# Patient Record
Sex: MALE | URBAN - METROPOLITAN AREA
[De-identification: names, ages, dates, MRNs, and addresses within clinical notes are randomized per-mention and may not be internally consistent; named-entity substitution may affect disease eponyms.]

---

## 2018-06-11 ENCOUNTER — LAB VISIT (OUTPATIENT)
Dept: LAB | Facility: HOSPITAL | Age: 63
End: 2018-06-11
Attending: INTERNAL MEDICINE
Payer: COMMERCIAL

## 2018-06-11 ENCOUNTER — OFFICE VISIT (OUTPATIENT)
Dept: RHEUMATOLOGY | Facility: CLINIC | Age: 63
End: 2018-06-11
Payer: COMMERCIAL

## 2018-06-11 VITALS
DIASTOLIC BLOOD PRESSURE: 77 MMHG | HEART RATE: 91 BPM | BODY MASS INDEX: 30.29 KG/M2 | SYSTOLIC BLOOD PRESSURE: 116 MMHG | WEIGHT: 223.63 LBS | HEIGHT: 72 IN

## 2018-06-11 DIAGNOSIS — Z79.631 LONG TERM METHOTREXATE USER: ICD-10-CM

## 2018-06-11 DIAGNOSIS — Z79.52 LONG TERM (CURRENT) USE OF SYSTEMIC STEROIDS: ICD-10-CM

## 2018-06-11 DIAGNOSIS — L53.9: ICD-10-CM

## 2018-06-11 DIAGNOSIS — Z55.9 EDUCATIONAL CIRCUMSTANCE: ICD-10-CM

## 2018-06-11 DIAGNOSIS — G72.9 MYOPATHY: ICD-10-CM

## 2018-06-11 DIAGNOSIS — Z86.718 HISTORY OF DVT (DEEP VEIN THROMBOSIS): ICD-10-CM

## 2018-06-11 DIAGNOSIS — L53.9: Primary | ICD-10-CM

## 2018-06-11 LAB
C3 SERPL-MCNC: 121 MG/DL
C4 SERPL-MCNC: 34 MG/DL
CK SERPL-CCNC: 35 U/L
CRP SERPL-MCNC: 4.4 MG/L
ERYTHROCYTE [SEDIMENTATION RATE] IN BLOOD BY WESTERGREN METHOD: 40 MM/HR
IGA SERPL-MCNC: 187 MG/DL
IGG SERPL-MCNC: 500 MG/DL
IGM SERPL-MCNC: 96 MG/DL

## 2018-06-11 PROCEDURE — 82550 ASSAY OF CK (CPK): CPT

## 2018-06-11 PROCEDURE — 85613 RUSSELL VIPER VENOM DILUTED: CPT

## 2018-06-11 PROCEDURE — 86146 BETA-2 GLYCOPROTEIN ANTIBODY: CPT | Mod: 59

## 2018-06-11 PROCEDURE — 86235 NUCLEAR ANTIGEN ANTIBODY: CPT

## 2018-06-11 PROCEDURE — 36415 COLL VENOUS BLD VENIPUNCTURE: CPT

## 2018-06-11 PROCEDURE — 82784 ASSAY IGA/IGD/IGG/IGM EACH: CPT | Mod: 59

## 2018-06-11 PROCEDURE — 86160 COMPLEMENT ANTIGEN: CPT | Mod: 59

## 2018-06-11 PROCEDURE — 84165 PROTEIN E-PHORESIS SERUM: CPT

## 2018-06-11 PROCEDURE — 85651 RBC SED RATE NONAUTOMATED: CPT

## 2018-06-11 PROCEDURE — 82085 ASSAY OF ALDOLASE: CPT

## 2018-06-11 PROCEDURE — 86160 COMPLEMENT ANTIGEN: CPT

## 2018-06-11 PROCEDURE — 99245 OFF/OP CONSLTJ NEW/EST HI 55: CPT | Mod: S$GLB,,, | Performed by: INTERNAL MEDICINE

## 2018-06-11 PROCEDURE — 85598 HEXAGNAL PHOSPH PLTLT NEUTRL: CPT

## 2018-06-11 PROCEDURE — 84165 PROTEIN E-PHORESIS SERUM: CPT | Mod: 26,,, | Performed by: PATHOLOGY

## 2018-06-11 PROCEDURE — 86147 CARDIOLIPIN ANTIBODY EA IG: CPT

## 2018-06-11 PROCEDURE — 99999 PR PBB SHADOW E&M-NEW PATIENT-LVL III: CPT | Mod: PBBFAC,,, | Performed by: INTERNAL MEDICINE

## 2018-06-11 PROCEDURE — 86038 ANTINUCLEAR ANTIBODIES: CPT

## 2018-06-11 PROCEDURE — 86140 C-REACTIVE PROTEIN: CPT

## 2018-06-11 RX ORDER — FUROSEMIDE 80 MG/1
80 TABLET ORAL 2 TIMES DAILY
COMMUNITY

## 2018-06-11 RX ORDER — SPIRONOLACTONE 25 MG/1
25 TABLET ORAL DAILY
COMMUNITY

## 2018-06-11 RX ORDER — PANTOPRAZOLE SODIUM 40 MG/1
40 TABLET, DELAYED RELEASE ORAL DAILY
COMMUNITY

## 2018-06-11 RX ORDER — METHOTREXATE 2.5 MG/1
20 TABLET ORAL
COMMUNITY

## 2018-06-11 RX ORDER — ASPIRIN 81 MG/1
81 TABLET ORAL DAILY
COMMUNITY

## 2018-06-11 RX ORDER — DEXAMETHASONE 4 MG/1
4 TABLET ORAL DAILY
COMMUNITY

## 2018-06-11 RX ORDER — IBUPROFEN 200 MG
1 TABLET ORAL
COMMUNITY

## 2018-06-11 RX ORDER — METOLAZONE 5 MG/1
5 TABLET ORAL DAILY
COMMUNITY

## 2018-06-11 RX ORDER — POTASSIUM CHLORIDE 20 MEQ/1
40 TABLET, EXTENDED RELEASE ORAL DAILY
COMMUNITY

## 2018-06-11 RX ORDER — CEPHALEXIN 500 MG/1
500 CAPSULE ORAL EVERY 8 HOURS
COMMUNITY

## 2018-06-11 RX ORDER — SERTRALINE HYDROCHLORIDE 50 MG/1
50 TABLET, FILM COATED ORAL DAILY
COMMUNITY

## 2018-06-11 RX ORDER — METOPROLOL TARTRATE 25 MG/1
25 TABLET, FILM COATED ORAL 2 TIMES DAILY
COMMUNITY

## 2018-06-11 RX ORDER — FOLIC ACID 1 MG/1
1 TABLET ORAL DAILY
COMMUNITY

## 2018-06-11 SDOH — SOCIAL DETERMINANTS OF HEALTH (SDOH): PROBLEMS RELATED TO EDUCATION AND LITERACY, UNSPECIFIED: Z55.9

## 2018-06-11 NOTE — LETTER
June 11, 2018      Jaleel Carias II, MD  [address] [facility] [address]           [facility] - Rheumatology  [address]  [place] [address]  Phone: [phone]  Fax: [phone]          Patient: Kevin Benitez   MR Number: [id number]   YOB: 1955   Date of Visit: 6/11/2018       Dear Dr. Jaleel Carias II:    Thank you for referring Kevin Benitez to me for evaluation. Attached you will find relevant portions of my assessment and plan of care.    If you have questions, please do not hesitate to call me. I look forward to following Kevin Benitez along with you.    Sincerely,    Debo Osei MD    Enclosure  CC:  No Recipients    If you would like to receive this communication electronically, please contact [email or web address] or [phone] to request more information on [employer] access.    For providers and/or their staff who would like to refer a patient to [facility], please contact us through our one-stop-shop provider referral line, [facility], at [phone].    If you feel you have received this communication in error or would no longer like to receive these types of communications, please e-mail [email or web address]

## 2018-06-11 NOTE — PROGRESS NOTES
"Subjective:     Patient ID: Kevin Benitez is a 63 y.o. male sent for consultation on possible autoimmune disorder by Dr. Aretha OSORIO, his PCP.    Chief Complaint: Disease Management       HPI   63 yr old man with multiple morbidities that include DM II sent by Dr. Jaleel Carias his PCP in [facility] to see if he has an autoimmune CTD basis for chronic generalized rashes that were ultimately diagnosed as idiopathic erythroderma at [facility].  He began having pruritic rashes about 1.5 years ago. He underwent an extensive w/u with biopsy and bone marrow aspirate (?bx). Rashes seem to respond to hi doses of steroids but he has had large fluctuations of weight gain & loss due to fluid retention of up to 20-30 lbs over 1-2 weeks requiring diuretics. He has had a renal evaluation and it was unremarkable. Has been on prednisone 60 mg/day which was switched to dexamethasone which he is now taking 4 mg/d but was on 6 mg/d and possibly more.   MTX was begun 11/17. He is currently taking 20 mg/wk orally.     He denies significant joint pain or joint swelling. He denies Raynaud's, dysphagia, tight skin, alopecia, oral ulcers, sicca symptoms, pleurisy, pericarditis, photosensitivity, skin rashes.  He has had fairly chronic mid epigastric dysphagia for both liquids and solids; denies true sicca symptoms but "eyes run"; denies dry mouth but is often thirsty. 3 months ago was hospitalized with RLE DVT and rx with anticoagulant (xarelto) for less than 3 months. It was stopped 1 month ago.   He denies significant AM stiffness (15 minutes).  He denies CTDs in family.        Current Outpatient Prescriptions   Medication Sig Dispense Refill    nicotine (NICODERM CQ) 21 mg/24 hr Place 1 patch onto the skin every 24 hours.       No current facility-administered medications for this visit.      Current Outpatient Prescriptions   Medication Sig Dispense Refill    aspirin (ECOTRIN) 81 MG EC tablet Take 81 mg by mouth once daily.      " cephALEXin (KEFLEX) 500 MG capsule Take 500 mg by mouth every 8 (eight) hours.      dexamethasone (DECADRON) 4 MG Tab Take 4 mg by mouth once daily.      folic acid (FOLVITE) 1 MG tablet Take 1 mg by mouth once daily.      furosemide (LASIX) 80 MG tablet Take 80 mg by mouth 2 (two) times daily.      methotrexate 2.5 MG Tab Take 20 mg by mouth every 7 days.      metOLazone (ZAROXOLYN) 5 MG tablet Take 5 mg by mouth once daily. One tablet Mon, Wed, and fRIDAY  30 MINUTES BEFORE MORNING LASIX      metoprolol tartrate (LOPRESSOR) 25 MG tablet Take 25 mg by mouth 2 (two) times daily.      nicotine (NICODERM CQ) 21 mg/24 hr Place 1 patch onto the skin every 24 hours.      pantoprazole (PROTONIX) 40 MG tablet Take 40 mg by mouth once daily.      potassium chloride SA (K-DUR,KLOR-CON) 20 MEQ tablet Take 40 mEq by mouth once daily.      ranitidine (ZANTAC) 150 MG tablet Take 150 mg by mouth every evening.      rivaroxaban (XARELTO) 20 mg Tab Take 20 mg by mouth daily with dinner or evening meal.      sertraline (ZOLOFT) 50 MG tablet Take 50 mg by mouth once daily.      spironolactone (ALDACTONE) 25 MG tablet Take 25 mg by mouth once daily.       No current facility-administered medications for this visit.          Review of patient's allergies indicates:   Allergen Reactions    Bacitracin Rash    Neomycin Rash       Review of Systems   Constitutional: Positive for chills, fatigue and unexpected weight change (at most 245 lbs; least 212 lbs). Negative for diaphoresis and fever.   HENT: Positive for trouble swallowing. Negative for drooling, nosebleeds, postnasal drip and tinnitus.    Eyes:        Eyes run   Respiratory: Positive for cough and shortness of breath. Negative for choking, chest tightness and wheezing.         Has had pneumonia x 2--last 2 weeks ago & 1 month.   Cardiovascular: Positive for leg swelling. Negative for palpitations.   Gastrointestinal: Positive for constipation.   Endocrine: Positive  for cold intolerance, polydipsia and polyuria.   Genitourinary: Positive for frequency (nocturia 4-5 x).   Musculoskeletal: Positive for myalgias (calves; ). Negative for arthralgias, back pain, joint swelling, neck pain and neck stiffness.   Skin: Positive for rash.   Allergic/Immunologic: Negative.    Neurological: Positive for syncope (2-3 x/day attributed to dehyddration), weakness (legs; ) and light-headedness. Negative for headaches.   Hematological: Negative.  Does not bruise/bleed easily.   Psychiatric/Behavioral: Positive for dysphoric mood and sleep disturbance (both attributed to prednisone). The patient is not nervous/anxious.        Past Medical History:   Diagnosis Date    Acid reflux     Allergy     Alopecia     Coronary artery disease     Deep vein thrombosis     Diabetes mellitus     Dry eyes     Dry mouth    Hospitalized for rashes & DVT      FAMILY HX  M 65: cirrhosis of liver; drank alcohol  F: 77: dementia; pneumonia; used to get rashes.   1 B  of cancer of throat--smoker  3 children;  19 grandchildren in good health.  Social History   Substance Use Topics    Smoking status: Current Every Day Smoker    Smokeless tobacco: Never Used    Alcohol use No   ;     Objective:     /77   Pulse 91   Ht 6' (1.829 m)   Wt 101.4 kg (223 lb 9.6 oz)   BMI 30.33 kg/m²   Accompanied by wife  Physical Exam   Vitals reviewed.  Constitutional: He is oriented to person, place, and time and well-developed, well-nourished, and in no distress. No distress.   HENT:   Head: Normocephalic and atraumatic.   Mouth/Throat: Oropharynx is clear and moist. No oropharyngeal exudate.   No facial rashes  Parotids not enlarged  No oral ulcers   Eyes: Conjunctivae and EOM are normal. Pupils are equal, round, and reactive to light. Right eye exhibits no discharge. Left eye exhibits no discharge. No scleral icterus.   Neck: Neck supple. No JVD present. No tracheal deviation present. No thyromegaly  present.   Cardiovascular: Normal rate, regular rhythm, normal heart sounds and intact distal pulses.  Exam reveals no gallop and no friction rub.    No murmur heard.  Pulmonary/Chest: Effort normal and breath sounds normal. No respiratory distress. He has no wheezes. He has no rales. He exhibits no tenderness.   Abdominal: Soft. Bowel sounds are normal. He exhibits no distension and no mass. There is no splenomegaly or hepatomegaly. There is no tenderness. There is no rebound and no guarding.   Lymphadenopathy:     He has no cervical adenopathy.        Right: No inguinal adenopathy present.        Left: No inguinal adenopathy present.   Neurological: He is alert and oriented to person, place, and time. He has normal reflexes. No cranial nerve deficit. Gait normal.   Proximal and distal muscle strength 5/5 in all but proximal LE; Proximal LE strength 4/5; cannot get out of chair without using arms.   Skin: Skin is warm and dry. No rash noted. He is not diaphoretic.     Dry, roughened skin especially dorsa of hands but generalized, including face.    Psychiatric: Mood, memory, affect and judgment normal.   Musculoskeletal: Normal range of motion. He exhibits edema. He exhibits no tenderness.   Cspine FROM no tenderness; + dorsocervical fat pad.  Tspine FROM no tenderness  Lspine FROM no tenderness.  TMJ: unremarkable  Shoulders: FROM; no synovitis;  Elbows: FROM; no synovitis; no tophi or nodules  Wrists: FROM; no synovitis;    MCPs: FROM; no synovitis; no metacarpalgia;  ok;  PIPs:FROM; no synovitis;   DIPs: FROM; no synovitis;   HIPS: FROM  Knees: FROM; no synovitis; no instability;  Ankles: FROM: no synovitis   Toes: ok;                 5/10/18: CBC WC 11.7; CMP cnne 1.93 (1.27); GFR 36; TP 5.8;     Assessment:   Idiopathic erythroderma by hx   Apparently by bx with unremarkable bone marrow exam   Followed by Tati Glez MD (Clara Chacon MD) [facility]  Hx of DVT   R/O APS  Sicca syndrome   Doubt  Sjogren's  Adverse steroid side effects: currently on dexamethasone 4 mg = prednisone 25 mg   Proximal lower myopathy   Dorsocervical fat pad   Gynecomastia    Pedal edema  DM II  Hypertension  Angina Pectoris by hx  Esophageal stricture  Obesity    Plan:   Discussed likelihood that we are not dealing with a rheumatologic disorder.  However, in an abundance of caution, we will do an abbreviated w/u.   Nevertheless, it may be more effective to switch MTX to SC and try to wean dexamethasone further down.  If MTX doesn't control his rash then he was advised to discuss with his dermatologist.   We would be glad to assist in handling other immunosuppresive type meds that the dermatologist decides on.  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 I don't think it explains this type of rash (but does make an autoimmune basis more credible.)  Patient should be considered for chronic anticoagulation.

## 2018-06-12 LAB
ALBUMIN SERPL ELPH-MCNC: 3.5 G/DL
ALPHA1 GLOB SERPL ELPH-MCNC: 0.36 G/DL
ALPHA2 GLOB SERPL ELPH-MCNC: 0.88 G/DL
ANA SER QL IF: NORMAL
ANTI-SSA ANTIBODY: 0.28 EU
ANTI-SSA INTERPRETATION: NEGATIVE
B-GLOBULIN SERPL ELPH-MCNC: 0.71 G/DL
CARDIOLIPIN IGG SER IA-ACNC: <9.4 GPL
CARDIOLIPIN IGM SER IA-ACNC: <9.4 MPL
GAMMA GLOB SERPL ELPH-MCNC: 0.55 G/DL
PATHOLOGIST INTERPRETATION SPE: NORMAL
PROT SERPL-MCNC: 6 G/DL

## 2018-06-13 LAB — ALDOLASE SERPL-CCNC: 4 U/L

## 2018-06-14 LAB
APTT HEX PL PPP: POSITIVE S
B2 GLYCOPROT1 IGA SER QL: <9 SAU
B2 GLYCOPROT1 IGG SER QL: <9 SGU
B2 GLYCOPROT1 IGM SER QL: <9 SMU

## 2018-06-15 LAB — LA PPP-IMP: POSITIVE

## 2018-06-19 ENCOUNTER — PATIENT MESSAGE (OUTPATIENT)
Dept: RHEUMATOLOGY | Facility: CLINIC | Age: 63
End: 2018-06-19